# Patient Record
Sex: FEMALE
[De-identification: names, ages, dates, MRNs, and addresses within clinical notes are randomized per-mention and may not be internally consistent; named-entity substitution may affect disease eponyms.]

---

## 2018-01-23 ENCOUNTER — HOSPITAL ENCOUNTER (EMERGENCY)
Dept: HOSPITAL 25 - UCCORT | Age: 28
Discharge: HOME | End: 2018-01-23
Payer: COMMERCIAL

## 2018-01-23 VITALS — DIASTOLIC BLOOD PRESSURE: 73 MMHG | SYSTOLIC BLOOD PRESSURE: 126 MMHG

## 2018-01-23 DIAGNOSIS — J02.9: Primary | ICD-10-CM

## 2018-01-23 DIAGNOSIS — Z87.891: ICD-10-CM

## 2018-01-23 PROCEDURE — G0463 HOSPITAL OUTPT CLINIC VISIT: HCPCS

## 2018-01-23 PROCEDURE — 99202 OFFICE O/P NEW SF 15 MIN: CPT

## 2018-01-23 PROCEDURE — 87502 INFLUENZA DNA AMP PROBE: CPT

## 2018-01-23 NOTE — UC
Throat Pain/Nasal Scooter HPI





- HPI Summary


HPI Summary: 





SORE THROAT X 1 DAY 


+ FEVER, CHILLS, BODY ACHES


NO NASAL CONGESTION, NO COUGH 





- History of Current Complaint


Chief Complaint: UCRespiratory


Stated Complaint: FLU LIKE SYMPTOMS


Time Seen by Provider: 01/23/18 08:08


Hx Obtained From: Patient


Hx Last Menstrual Period: 12/27/17


Pregnant?: No


Onset/Duration: Gradual Onset, Lasting Days - 1, Still Present


Severity: Moderate


Cough: None


Associated Signs & Symptoms: Positive: Fever.  Negative: Sinus Discomfort, 

Nasal Discharge, Rash





- Allergies/Home Medications


Allergies/Adverse Reactions: 


 Allergies











Allergy/AdvReac Type Severity Reaction Status Date / Time


 


No Known Allergies Allergy   Verified 01/23/18 08:19











Home Medications: 


 Home Medications





Aspirin TAB* [Aspirin 325 MG TAB*] 650 mg PO ONCE PRN 01/23/18 [History 

Confirmed 01/23/18]


Doxylamine-Dm [Nite Time Cough] 1 liq PO ONCE PRN 01/23/18 [History Confirmed 01 /23/18]











PMH/Surg Hx/FS Hx/Imm Hx


Previously Healthy: Yes





- Surgical History


Surgical History: None





- Family History


Known Family History: 


   Negative: Diabetes





- Social History


Alcohol Use: None


Substance Use Type: None


Smoking Status (MU): Former Smoker


When Did the Patient Quit Smoking/Using Tobacco: 1 year ago





- Immunization History


Most Recent Influenza Vaccination: not this season





Review of Systems


Constitutional: Fever, Chills, Fatigue


Skin: Negative


Eyes: Negative


ENT: Sore Throat


Respiratory: Negative


Cardiovascular: Negative


Gastrointestinal: Negative


Is Patient Immunocompromised?: No


All Other Systems Reviewed And Are Negative: Yes





Physical Exam


Triage Information Reviewed: Yes


Appearance: Well-Appearing, No Pain Distress, Well-Nourished


Vital Signs: 


 Initial Vital Signs











Temp  98.3 F   01/23/18 08:20


 


Pulse  91   01/23/18 08:20


 


Resp  18   01/23/18 08:20


 


BP  126/73   01/23/18 08:20


 


Pulse Ox  99   01/23/18 08:20











Vital Signs Reviewed: Yes


Eyes: Positive: Conjunctiva Clear


ENT: Positive: Normal ENT inspection, Hearing grossly normal, Pharyngeal 

erythema, TMs normal.  Negative: Nasal congestion, Nasal drainage, TM bulging, 

TM dull, TM red, Tonsillar swelling, Tonsillar exudate, Trismus


Neck: Positive: Supple, Nontender, No Lymphadenopathy


Respiratory: Positive: Chest non-tender, Lungs clear, Normal breath sounds


Cardiovascular: Positive: RRR, No Murmur, Pulses Normal


Abdominal Exam: Normal


Abdomen Description: Positive: Nontender, Soft


Bowel Sounds: Positive: Present


Skin Exam: Normal





Throat Pain/Nasal Course/Dx





- Differential Dx/Diagnosis


Provider Diagnoses: PHARYNGITIS





Discharge





- Discharge Plan


Condition: Stable


Disposition: HOME


Prescriptions: 


Amoxicillin PO (*) [Amoxicillin 875 MG (*)] 875 mg PO BID #20 tab


Patient Education Materials:  Strep Throat (DC)


Forms:  *Work Release


Referrals: 


Non Staff,Doctor [Primary Care Provider] - If Needed

## 2019-11-04 ENCOUNTER — HOSPITAL ENCOUNTER (EMERGENCY)
Dept: HOSPITAL 25 - UCCORT | Age: 29
Discharge: HOME | End: 2019-11-04
Payer: COMMERCIAL

## 2019-11-04 VITALS — DIASTOLIC BLOOD PRESSURE: 73 MMHG | SYSTOLIC BLOOD PRESSURE: 128 MMHG

## 2019-11-04 DIAGNOSIS — A04.5: Primary | ICD-10-CM

## 2019-11-04 DIAGNOSIS — Z87.891: ICD-10-CM

## 2019-11-04 PROCEDURE — 83630 LACTOFERRIN FECAL (QUAL): CPT

## 2019-11-04 PROCEDURE — 87328 CRYPTOSPORIDIUM AG IA: CPT

## 2019-11-04 PROCEDURE — 87046 STOOL CULTR AEROBIC BACT EA: CPT

## 2019-11-04 PROCEDURE — 87045 FECES CULTURE AEROBIC BACT: CPT

## 2019-11-04 PROCEDURE — 99212 OFFICE O/P EST SF 10 MIN: CPT

## 2019-11-04 PROCEDURE — 87329 GIARDIA AG IA: CPT

## 2019-11-04 PROCEDURE — 87899 AGENT NOS ASSAY W/OPTIC: CPT

## 2019-11-04 PROCEDURE — G0463 HOSPITAL OUTPT CLINIC VISIT: HCPCS

## 2019-11-04 PROCEDURE — 87077 CULTURE AEROBIC IDENTIFY: CPT

## 2019-11-04 NOTE — UC
Abdominal Pain Female HPI





- HPI Summary


HPI Summary: 


 29 year old female with no PMH presents with nausea, diarrhea.   Was seen at 

[facility] last tues due to fever 104, dehydration, diarrhea.  no bloody stools

, no vomiting.    Patient treated with hydration, no ABX, sent home.   Since 

patient has not had a fever but has RLQ pressure, continued stools several 

times a day.  no lightheadedness, dizziness.   + nausea, however drinking well, 

decreased appetite with weight loss.  











- History of Current Complaint


Chief Complaint: UCGI


Stated Complaint: DIARRHEA SINCE 10/29/19


Time Seen by Provider: 11/04/19 10:27


Hx Obtained From: Patient


Hx Last Menstrual Period: 10/5/19


Pregnant?: No


Onset/Duration: Sudden Onset, Lasting Days, Still Present - improved from 

initial presentation


Severity Initially: Severe


Severity Currently: None


Pain Intensity: 0


Pain Scale Used: 0-10 Numeric


Location: Discrete At: RLQ


Radiates: No


Character: Aching


Aggravating Factor(s): Nothing


Alleviating Factor(s): Nothing


Associated Signs and Symptoms: Positive: Decreased Appetite, Nausea, Diarrhea.  

Negative: Fever, Cough, Chest Pain, Urinary Symptoms, Vaginal Bleeding, Vaginal 

Discharge, Vomiting


Allergies/Adverse Reactions: 


 Allergies











Allergy/AdvReac Type Severity Reaction Status Date / Time


 


No Known Allergies Allergy   Verified 11/04/19 10:06











Home Medications: 


 Home Medications





Acetaminophen [Tylenol] 2 tab PO ONCE 11/04/19 [History Confirmed 11/04/19]


Escitalopram Oxalate [Lexapro] 20 mg PO DAILY 11/04/19 [History Confirmed 11/04/ 19]


Kaolin/Pectin [Kaolin Pectin Suspension] 30 ml PO ONCE 11/04/19 [History 

Confirmed 11/04/19]











PMH/Surg Hx/FS Hx/Imm Hx


Previously Healthy: Yes





- Surgical History


Surgical History: None





- Family History


Known Family History: Positive: Non-Contributory


   Negative: Diabetes





- Social History


Alcohol Use: Rare


Substance Use Type: None


Smoking Status (MU): Former Smoker


When Did the Patient Quit Smoking/Using Tobacco: 2016





- Immunization History


Most Recent Influenza Vaccination: not this season





Review of Systems


All Other Systems Reviewed And Are Negative: Yes


Constitutional: Positive: Negative


Skin: Positive: Negative


Cardiovascular: Negative: Other


Gastrointestinal: Positive: Abdominal Pain, Diarrhea, Nausea.  Negative: 

Vomiting


Genitourinary: Negative: Hematuria, Frequency, Urgency, Vaginal/Penile Burning, 

Vaginal/Penile Itching, Vaginal/Penile Discharge, Vaginal/Penile Pain, Vaginal/

Penile Tenderness, Ulceration/Lesion, Abnormal Bleeding


Is Patient Immunocompromised?: No





Physical Exam


Triage Information Reviewed: Yes


Appearance: Well-Appearing, No Pain Distress, Well-Nourished


Vital Signs: 


 Initial Vital Signs











Temp  97.2 F   11/04/19 10:02


 


Pulse  99   11/04/19 10:02


 


Resp  14   11/04/19 10:02


 


BP  128/73   11/04/19 10:02


 


Pulse Ox  99   11/04/19 10:02











Eyes: Positive: Conjunctiva Clear


ENT: Positive: Hearing grossly normal


Respiratory: Positive: Chest non-tender, Lungs clear, Normal breath sounds, No 

respiratory distress, No accessory muscle use.  Negative: Crackles, Rhonchi, 

Stridor, Wheezing


Cardiovascular: Positive: RRR, No Murmur


Abdomen Description: Positive: No Organomegaly, Soft, Other: - RLQ tenderness, 

mild.  neg psoas sign, neg obturator.  Negative: CVA Tenderness (R), CVA 

Tenderness (L), Distended, Guarding, Hepatomegaly, Splenomegaly


Bowel Sounds: Positive: Present, Hyperactive - mild


Musculoskeletal Exam: Normal


Psychological Exam: Normal


Skin Exam: Normal





Abd Pain Female Course/Dx





- Course


Course Of Treatment: 





- Over the counter probiotics as needed to help with symptoms 


- Over the counter medications such as Gas-X to help with bloating. 


- Protonix daily to help with stomach pains, nausea daily 


- Tums/ ANt-acid tablets to help with abdominal pains 


- Stool kit given-  Please bring back sample for testing for bacterial, 

parasites 


- Immodium as needed for loose stools, do not take more than 1 tab/ day 


- Return with increased symptoms, fever > 102, lightheadedness, dizziness. 


- Increase fluid intake to prevent dehydration 





- Differential Dx/Diagnosis


Differential Diagnosis: Diverticulitis, Renal Colic, Urinary Tract Infection


Provider Diagnosis: 


 Campylobacter enteritis








Discharge ED





- Sign-Out/Discharge


Documenting (check all that apply): Patient Departure


All imaging exams completed and their final reports reviewed: No Studies





- Discharge Plan


Condition: Good


Disposition: HOME


Prescriptions: 


Azithromycin TAB* [Zithromax TAB*] 250 mg PO DAILY #6 tab


Pantoprazole TAB * [Protonix TAB*] 40 mg PO DAILY #30 tab


Patient Education Materials:  Gastroenteritis (ED)


Referrals: 


[facility] [Outside]


No Primary Care Phys,NOPCP [Primary Care Provider] - 


Additional Instructions: 


- Over the counter probiotics as needed to help with symptoms 


- Over the counter medications such as Gas-X to help with bloating. 


- Protonix daily to help with stomach pains, nausea daily 


- Tums/ ANt-acid tablets to help with abdominal pains 


- Stool kit given-  Please bring back sample for testing for bacterial, 

parasites 


- Immodium as needed for loose stools, do not take more than 1 tab/ day 


- Return with increased symptoms, fever > 102, lightheadedness, dizziness. 


- Increase fluid intake to prevent dehydration 








- Billing Disposition and Condition


Condition: GOOD


Disposition: Home

## 2019-11-07 NOTE — UC
- Progress Note


Progress Note: 





please notify patient of stool results





since her symptoms lave lasted 10 days and are not improved we will Rx Z-kemar





recheck in four days if not better





don't take lexapro while on z-kemar





Course/Dx





- Diagnoses


Provider Diagnoses: 


 Campylobacter enteritis








Discharge ED





- Sign-Out/Discharge


Documenting (check all that apply): Post-Discharge Follow Up


All imaging exams completed and their final reports reviewed: No Studies





- Discharge Plan


Condition: Good


Disposition: HOME


Prescriptions: 


Pantoprazole TAB * [Protonix TAB*] 40 mg PO DAILY #30 tab


Patient Education Materials:  Gastroenteritis (ED)


Referrals: 


[facility] [Outside]


No Primary Care Phys,NOPCP [Primary Care Provider] - 


Additional Instructions: 


- Over the counter probiotics as needed to help with symptoms 


- Over the counter medications such as Gas-X to help with bloating. 


- Protonix daily to help with stomach pains, nausea daily 


- Tums/ ANt-acid tablets to help with abdominal pains 


- Stool kit given-  Please bring back sample for testing for bacterial, 

parasites 


- Immodium as needed for loose stools, do not take more than 1 tab/ day 


- Return with increased symptoms, fever > 102, lightheadedness, dizziness. 


- Increase fluid intake to prevent dehydration 








- Billing Disposition and Condition


Condition: GOOD


Disposition: Home